# Patient Record
Sex: MALE | Race: WHITE | URBAN - METROPOLITAN AREA
[De-identification: names, ages, dates, MRNs, and addresses within clinical notes are randomized per-mention and may not be internally consistent; named-entity substitution may affect disease eponyms.]

---

## 2024-10-15 ENCOUNTER — OFFICE VISIT (OUTPATIENT)
Dept: FAMILY MEDICINE CLINIC | Facility: CLINIC | Age: 59
End: 2024-10-15
Payer: COMMERCIAL

## 2024-10-15 VITALS
HEIGHT: 68 IN | HEART RATE: 56 BPM | SYSTOLIC BLOOD PRESSURE: 116 MMHG | WEIGHT: 231 LBS | DIASTOLIC BLOOD PRESSURE: 73 MMHG | OXYGEN SATURATION: 99 % | BODY MASS INDEX: 35.01 KG/M2

## 2024-10-15 DIAGNOSIS — R93.89 ABNORMAL ULTRASOUND: ICD-10-CM

## 2024-10-15 DIAGNOSIS — Z00.00 ANNUAL PHYSICAL EXAM: Primary | ICD-10-CM

## 2024-10-15 DIAGNOSIS — Z12.2 SCREENING FOR LUNG CANCER: ICD-10-CM

## 2024-10-15 DIAGNOSIS — R94.39 ABNORMAL STRESS TEST: ICD-10-CM

## 2024-10-15 DIAGNOSIS — Z12.11 SCREENING FOR COLON CANCER: ICD-10-CM

## 2024-10-15 PROCEDURE — 99386 PREV VISIT NEW AGE 40-64: CPT | Performed by: FAMILY MEDICINE

## 2024-10-15 RX ORDER — ATORVASTATIN CALCIUM 20 MG/1
20 TABLET, FILM COATED ORAL NIGHTLY
COMMUNITY

## 2024-10-15 RX ORDER — FOLIC ACID 1 MG/1
TABLET ORAL
COMMUNITY

## 2024-10-15 NOTE — PROGRESS NOTES
Subjective:   Patient ID: Alejandrina Zuleta is a 59 year old male.    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  Negative for activity change, appetite change, diaphoresis and fatigue.     Respiratory: Negative.  Negative for apnea, cough, chest tightness and shortness of breath.    Cardiovascular: Negative.  Negative for chest pain, palpitations and leg swelling.   Gastrointestinal: Negative.  Negative for abdominal pain.   Skin: Negative.           Psychiatric/Behavioral: Negative.        Current Outpatient Medications   Medication Sig Dispense Refill    atorvastatin 20 MG Oral Tab Take 1 tablet (20 mg total) by mouth nightly.      Cholecalciferol (VITAMIN D3) 1.25 MG (19967 UT) Oral Cap Take by mouth.       Allergies:Allergies[1]    Objective:   Physical Exam  Constitutional:       Appearance: He is well-developed.   Cardiovascular:      Rate and Rhythm: Normal rate and regular rhythm.      Heart sounds: Normal heart sounds.   Pulmonary:      Effort: Pulmonary effort is normal.      Breath sounds: Normal breath sounds.   Abdominal:      General: Bowel sounds are normal.      Palpations: Abdomen is soft.   Skin:     General: Skin is warm and dry.   Neurological:      Mental Status: He is alert.      Deep Tendon Reflexes: Reflexes are normal and symmetric.         Assessment & Plan:   1. Annual physical exam    2. Screening for colon cancer    3. Abnormal ultrasound    4. Abnormal stress test    5. Screening for lung cancer    Needs to do testing as above   Diet and exercise discussed will f/u     Orders Placed This Encounter   Procedures    Comp Metabolic Panel (14)    Lipid Panel    CBC With Differential With Platelet    PSA (Screening) [E]    Hemoglobin A1C    TSH [E]       Meds This Visit:  Requested  Prescriptions      No prescriptions requested or ordered in this encounter       Imaging & Referrals:  CARDIO - INTERNAL  OP REFERRAL TO [facility] GI TELEPHONE COLON SCREEN  US CAROTID DOPPLER BILAT - DIAG IMG (CPT=93880)  CT LUNG LD SCREENING(CPT=71271)         [1] No Known Allergies

## 2024-10-16 ENCOUNTER — PATIENT MESSAGE (OUTPATIENT)
Dept: ADMINISTRATIVE | Age: 59
End: 2024-10-16

## 2024-10-18 ENCOUNTER — TELEPHONE (OUTPATIENT)
Dept: FAMILY MEDICINE CLINIC | Facility: CLINIC | Age: 59
End: 2024-10-18

## 2024-10-18 NOTE — TELEPHONE ENCOUNTER
Language Line Annelise, [id number], German    Patient called (identified name and ),   Was returning call about his wife Alanis Zuleta's previous mammogram information.  See separate telephone encounter for  [id number].    Patient also had question about a bill he received.  Warm transferred to Billing at [phone].

## 2024-10-23 LAB
ABSOLUTE BASOPHILS: 69 CELLS/UL (ref 0–200)
ABSOLUTE EOSINOPHILS: 101 CELLS/UL (ref 15–500)
ABSOLUTE LYMPHOCYTES: 1821 CELLS/UL (ref 850–3900)
ABSOLUTE MONOCYTES: 485 CELLS/UL (ref 200–950)
ABSOLUTE NEUTROPHILS: 3824 CELLS/UL (ref 1500–7800)
ALBUMIN/GLOBULIN RATIO: 1.6 (CALC) (ref 1–2.5)
ALBUMIN: 4.5 G/DL (ref 3.6–5.1)
ALKALINE PHOSPHATASE: 44 U/L (ref 35–144)
ALT: 36 U/L (ref 9–46)
AST: 22 U/L (ref 10–35)
BASOPHILS: 1.1 %
BILIRUBIN, TOTAL: 1 MG/DL (ref 0.2–1.2)
BUN: 14 MG/DL (ref 7–25)
CALCIUM: 9.2 MG/DL (ref 8.6–10.3)
CARBON DIOXIDE: 27 MMOL/L (ref 20–32)
CHLORIDE: 104 MMOL/L (ref 98–110)
CHOL/HDLC RATIO: 5.9 (CALC)
CHOLESTEROL, TOTAL: 224 MG/DL
CREATININE: 0.86 MG/DL (ref 0.7–1.35)
EGFR: 99 ML/MIN/1.73M2
EOSINOPHILS: 1.6 %
GLOBULIN: 2.8 G/DL (CALC) (ref 1.9–3.7)
GLUCOSE: 82 MG/DL (ref 65–99)
HDL CHOLESTEROL: 38 MG/DL
HEMATOCRIT: 48.7 % (ref 38.5–50)
HEMOGLOBIN A1C: 5.9 % OF TOTAL HGB
HEMOGLOBIN: 16.6 G/DL (ref 13.2–17.1)
LDL-CHOLESTEROL: 155 MG/DL (CALC)
LYMPHOCYTES: 28.9 %
MCH: 31.3 PG (ref 27–33)
MCHC: 34.1 G/DL (ref 32–36)
MCV: 91.9 FL (ref 80–100)
MONOCYTES: 7.7 %
MPV: 10.7 FL (ref 7.5–12.5)
NEUTROPHILS: 60.7 %
NON-HDL CHOLESTEROL: 186 MG/DL (CALC)
PLATELET COUNT: 145 THOUSAND/UL (ref 140–400)
POTASSIUM: 4.3 MMOL/L (ref 3.5–5.3)
PROTEIN, TOTAL: 7.3 G/DL (ref 6.1–8.1)
PSA, TOTAL: 0.4 NG/ML
RDW: 12.6 % (ref 11–15)
RED BLOOD CELL COUNT: 5.3 MILLION/UL (ref 4.2–5.8)
SODIUM: 140 MMOL/L (ref 135–146)
TRIGLYCERIDES: 176 MG/DL
TSH W/REFLEX TO FT4: 2.03 MIU/L (ref 0.4–4.5)
WHITE BLOOD CELL COUNT: 6.3 THOUSAND/UL (ref 3.8–10.8)

## 2024-10-24 ENCOUNTER — TELEPHONE (OUTPATIENT)
Dept: FAMILY MEDICINE CLINIC | Facility: CLINIC | Age: 59
End: 2024-10-24

## 2024-10-24 DIAGNOSIS — E78.00 PURE HYPERCHOLESTEROLEMIA: Primary | ICD-10-CM

## 2024-10-24 NOTE — TELEPHONE ENCOUNTER
Patient calling, saw results on mychart.   Patient was asking MD to call him about results because she speaks his language.  Advised that we need to allow 3-4 business days for MD to review results and respond accordingly   Patient states understanding.  He does not use mychart and wants a phone call please.     FYI to PCP so results can be called by her or by RN staff once reviewed  Patient states understanding we need time to review and does not expect  call back today or tomorrow, just wants to be called with results vs mychart messages.

## 2024-10-25 RX ORDER — ROSUVASTATIN CALCIUM 20 MG/1
20 TABLET, COATED ORAL NIGHTLY
Qty: 90 TABLET | Refills: 1 | Status: SHIPPED | OUTPATIENT
Start: 2024-10-25 | End: 2025-04-23

## 2024-11-19 RX ORDER — FOLIC ACID 1 MG/1
1 TABLET ORAL WEEKLY
Qty: 12 CAPSULE | Refills: 3 | Status: SHIPPED | OUTPATIENT
Start: 2024-11-19

## 2024-11-19 NOTE — TELEPHONE ENCOUNTER
Please review. Protocol Failed; No Protocol    Medication(s) to Refill:   Requested Prescriptions     Pending Prescriptions Disp Refills    Cholecalciferol (VITAMIN D3) 1.25 MG (36048 UT) Oral Cap 1 capsule 0     Sig: Take by mouth.         Reason for Medication Refill being sent to Provider / Reason Protocol Failed:  [x] Non-Protocol Medication        Recent Labs:  No results found for: \"VITD\", \"QVITD\", \"[id number]\"           Requested Prescriptions   Pending Prescriptions Disp Refills    Cholecalciferol (VITAMIN D3) 1.25 MG (73416 UT) Oral Cap 1 capsule 0     Sig: Take by mouth.       There is no refill protocol information for this order              Recent Outpatient Visits              1 month ago Annual physical exam    [facility] [place]    Office Visit

## 2024-12-03 ENCOUNTER — TELEPHONE (OUTPATIENT)
Dept: FAMILY MEDICINE CLINIC | Facility: CLINIC | Age: 59
End: 2024-12-03

## 2024-12-06 ENCOUNTER — TELEPHONE (OUTPATIENT)
Dept: FAMILY MEDICINE CLINIC | Facility: CLINIC | Age: 59
End: 2024-12-06

## 2024-12-06 DIAGNOSIS — R91.1 LUNG NODULE: Primary | ICD-10-CM

## 2024-12-06 NOTE — TELEPHONE ENCOUNTER
Spoke to the patient   Lung nodules increased -needs repeat in 3 months   Order placed patient agrees

## 2024-12-10 DIAGNOSIS — I25.10 CAD (CORONARY ARTERY DISEASE): Primary | ICD-10-CM

## 2024-12-10 DIAGNOSIS — R06.02 SOB (SHORTNESS OF BREATH): ICD-10-CM

## 2024-12-12 ENCOUNTER — TELEPHONE (OUTPATIENT)
Dept: FAMILY MEDICINE CLINIC | Facility: CLINIC | Age: 59
End: 2024-12-12

## 2024-12-12 DIAGNOSIS — I25.10 CORONARY ARTERY DISEASE INVOLVING NATIVE CORONARY ARTERY OF NATIVE HEART WITHOUT ANGINA PECTORIS: Primary | ICD-10-CM

## 2024-12-12 NOTE — TELEPHONE ENCOUNTER
Patient is having a ct angio coronary procedure on 12/19 but needs to get labs before. Needs a bmp. Spoke to justyna and states she does not see any order or results in epic. Had advised patient that these lab results need to be in by Wednesday.

## 2024-12-16 LAB
BUN: 12 MG/DL (ref 7–25)
CALCIUM: 9.4 MG/DL (ref 8.6–10.3)
CARBON DIOXIDE: 27 MMOL/L (ref 20–32)
CHLORIDE: 102 MMOL/L (ref 98–110)
CREATININE: 0.93 MG/DL (ref 0.7–1.35)
EGFR: 94 ML/MIN/1.73M2
GLUCOSE: 93 MG/DL (ref 65–99)
POTASSIUM: 4.1 MMOL/L (ref 3.5–5.3)
SODIUM: 139 MMOL/L (ref 135–146)

## 2024-12-16 NOTE — TELEPHONE ENCOUNTER
Michelle calling from [facility]     Michelle states the patient is supposed to have labs done and procedure  may be canceled if labs not done  ( 12/19/24 )       Michelle states fax was sent on 12/12 for Dr Ovalles for  pre-medication for this patient      ( Coronary CT angio communication form )     [facility]  staff-- Please check for incoming fax         Called patient  at  [phone] with Language Line  Camila  ID# [id number]      Left Voicemail to call back our office. Office phone number provided with office telephone hours.     Staff-patient needs to have BMP done  ASAP and should go to [facility]

## 2024-12-16 NOTE — TELEPHONE ENCOUNTER
Patient returned our call ( Identified name and date of birth )     Patient asking for Rwandan      With Language Line  Maureenodon   ID # [id number]    Explained to the patient he needs to have BMP done ASAP so procedure does not get canceled  as our office spoke with the Cardiac center   Patient states he will go to [facility] as it is close to his home   Explained to the patient that Michelle Murray states if labs are not resulted they procedure may be canceled     Order placed to [facility] as requested   Patient states has fax number for Frances to fax her the results once he receives them

## 2025-01-20 RX ORDER — FOLIC ACID 1 MG/1
1 TABLET ORAL WEEKLY
Qty: 12 CAPSULE | Refills: 3 | Status: SHIPPED | OUTPATIENT
Start: 2025-01-20

## 2025-01-20 RX ORDER — ROSUVASTATIN CALCIUM 20 MG/1
20 TABLET, COATED ORAL NIGHTLY
Qty: 90 TABLET | Refills: 1 | Status: SHIPPED | OUTPATIENT
Start: 2025-01-20 | End: 2025-07-19

## 2025-01-31 ENCOUNTER — TELEPHONE (OUTPATIENT)
Dept: FAMILY MEDICINE CLINIC | Facility: CLINIC | Age: 60
End: 2025-01-31

## 2025-01-31 DIAGNOSIS — I25.10 CORONARY ARTERY DISEASE: Primary | ICD-10-CM

## 2025-01-31 NOTE — TELEPHONE ENCOUNTER
Dr. Ovalles: can you call patient.   He would like to explain what he needs. He said Dr Mac ordered some tests on him.   Please call him to clarify. He preferred to speak to you.

## 2025-01-31 NOTE — TELEPHONE ENCOUNTER
Patient  is calling with the assistance of  ID # [id number]  requesting the Basic Metabolic Panel 8 be sent to [facility]    Patient mentions he needs to complete this to be able to complete the MRI.

## 2025-03-11 ENCOUNTER — HOSPITAL ENCOUNTER (OUTPATIENT)
Dept: CT IMAGING | Age: 60
End: 2025-03-11
Attending: INTERNAL MEDICINE

## 2025-04-14 ENCOUNTER — TELEPHONE (OUTPATIENT)
Dept: FAMILY MEDICINE CLINIC | Facility: CLINIC | Age: 60
End: 2025-04-14

## 2025-04-14 DIAGNOSIS — E78.00 PURE HYPERCHOLESTEROLEMIA: Primary | ICD-10-CM

## 2025-04-14 NOTE — TELEPHONE ENCOUNTER
Patient is requesting follow up labs be placed and mentions he does not need to schedule this appointment with the provider at this time.    Patient mentions he will schedule with provider at a later time..       Patient is requesting [facility] preferred    Please call patient to confirm labs ordered

## 2025-04-15 NOTE — TELEPHONE ENCOUNTER
With Uzbek  Chikis #[id number],  Left message to call back-transfer to triage.        Micaela Ovalles MD to Em Rn Triage        4/15/25  9:58 AM  The order placed please call him

## 2025-04-20 LAB
ALBUMIN/GLOBULIN RATIO: 1.9 (CALC) (ref 1–2.5)
ALBUMIN: 5 G/DL (ref 3.6–5.1)
ALKALINE PHOSPHATASE: 43 U/L (ref 35–144)
ALT: 35 U/L (ref 9–46)
AST: 25 U/L (ref 10–35)
BILIRUBIN, DIRECT: 0.2 MG/DL
BILIRUBIN, INDIRECT: 0.8 MG/DL (CALC) (ref 0.2–1.2)
BILIRUBIN, TOTAL: 1 MG/DL (ref 0.2–1.2)
CHOL/HDLC RATIO: 3.6 (CALC)
CHOLESTEROL, TOTAL: 138 MG/DL
GLOBULIN: 2.6 G/DL (CALC) (ref 1.9–3.7)
HDL CHOLESTEROL: 38 MG/DL
LDL-CHOLESTEROL: 76 MG/DL (CALC)
NON-HDL CHOLESTEROL: 100 MG/DL (CALC)
PROTEIN, TOTAL: 7.6 G/DL (ref 6.1–8.1)
TRIGLYCERIDES: 144 MG/DL

## 2025-04-24 ENCOUNTER — TELEMEDICINE (OUTPATIENT)
Dept: FAMILY MEDICINE CLINIC | Facility: CLINIC | Age: 60
End: 2025-04-24
Payer: COMMERCIAL

## 2025-04-24 DIAGNOSIS — R91.8 PULMONARY NODULES: Primary | ICD-10-CM

## 2025-04-24 PROCEDURE — 98005 SYNCH AUDIO-VIDEO EST LOW 20: CPT | Performed by: FAMILY MEDICINE

## 2025-04-24 NOTE — PROGRESS NOTES
Subjective:   Patient ID: Alejandrina Zuleta is a 59 year old male.  Telehealth outside of [facility]  Telehealth Verbal Consent   I conducted a telehealth visit with Alejandrina Zuleta today, 04/24/25, which was completed using two-way, real-time interactive audio and video communication. This has been done in good severiano to provide continuity of care in the best interest of the provider-patient relationship, due to the COVID -19 public health crisis/national emergency where restrictions of face-to-face office visits are ongoing. Every conscious effort was taken to allow for sufficient and adequate time to complete the visit.  The patient was made aware of the limitations of the telehealth visit, including treatment limitations as no physical exam could be performed.  The patient was advised to call 911 or to go to the ER in case there was an emergency.  The patient was also advised of the potential privacy & security concerns related to the telehealth platform.   The patient was made aware of where to find [facility]'s notice of privacy practices, telehealth consent form and other related consent forms and documents.  which are located on the [facility] website. The patient verbally agreed to telehealth consent form, related consents and the risks discussed.    Lastly, the patient confirmed that they were in [place].   Included in this visit, time may have been spent reviewing labs, medications, radiology tests and decision making. Appropriate medical decision-making and tests are ordered as detailed in the plan of care above.  Coding/billing information is submitted for this visit based on complexity of care and/or time spent for the visit.  This is video visit      HPI  Patient here for f/u test results   Also reporting some dyspnea and also chest pain on and off   Its more like pressure type of pain  History/Other:   Review of Systems    Constitutional: Negative.  Negative for activity change, appetite change, diaphoresis and fatigue.      Respiratory: Negative.  Negative for apnea, cough, chest tightness and shortness of breath.    Cardiovascular: see hpi Gastrointestinal: Negative.  Negative for abdominal pain.   Skin: Negative.           Psychiatric/Behavioral: Negative.        Current Medications[1]  Allergies:Allergies[2]    Objective:   Physical Exam  Constitutional:       Appearance: He is well-developed.   Cardiovascular:      Rate and Rhythm: Normal rate and regular rhythm.      Heart sounds: Normal heart sounds.   Pulmonary:      Effort: Pulmonary effort is normal.      Breath sounds: Normal breath sounds.   Abdominal:      General: Bowel sounds are normal.      Palpations: Abdomen is soft.   Neurological:      Mental Status: He is alert.      Deep Tendon Reflexes: Reflexes are normal and symmetric.         Assessment & Plan:   1. Pulmonary nodules    Needs repeat test ct scan   2. Chest pain   Needs to see cardiologist     No orders of the defined types were placed in this encounter.      Meds This Visit:  Requested Prescriptions      No prescriptions requested or ordered in this encounter       Imaging & Referrals:  CT CHEST (CPT=71250)         [1]   Current Outpatient Medications   Medication Sig Dispense Refill    rosuvastatin (CRESTOR) 20 MG Oral Tab Take 1 tablet (20 mg total) by mouth nightly. For cholesterol. 90 tablet 1    Cholecalciferol (VITAMIN D3) 1.25 MG (86742 UT) Oral Cap Take 1 capsule by mouth once a week. 12 capsule 3   [2] No Known Allergies

## 2025-05-05 ENCOUNTER — TELEPHONE (OUTPATIENT)
Dept: FAMILY MEDICINE CLINIC | Facility: CLINIC | Age: 60
End: 2025-05-05

## 2025-05-06 ENCOUNTER — TELEPHONE (OUTPATIENT)
Dept: FAMILY MEDICINE CLINIC | Facility: CLINIC | Age: 60
End: 2025-05-06

## 2025-05-06 NOTE — TELEPHONE ENCOUNTER
Patient calling to request order for CT Chest be faxed to [facility] at [phone]. Needs to have order sent to reschedule. Asking if can be sent to day, asking for CubeSensorst message or phone call once sent.

## 2025-05-08 ENCOUNTER — TELEPHONE (OUTPATIENT)
Dept: FAMILY MEDICINE CLINIC | Facility: CLINIC | Age: 60
End: 2025-05-08

## 2025-05-09 ENCOUNTER — TELEPHONE (OUTPATIENT)
Dept: FAMILY MEDICINE CLINIC | Facility: CLINIC | Age: 60
End: 2025-05-09

## 2025-08-05 RX ORDER — ROSUVASTATIN CALCIUM 20 MG/1
20 TABLET, COATED ORAL NIGHTLY
Qty: 90 TABLET | Refills: 3 | Status: CANCELLED | OUTPATIENT
Start: 2025-08-05 | End: 2026-07-31

## 2025-08-05 RX ORDER — ROSUVASTATIN CALCIUM 20 MG/1
20 TABLET, COATED ORAL NIGHTLY
Qty: 90 TABLET | Refills: 3 | Status: SHIPPED | OUTPATIENT
Start: 2025-08-05 | End: 2026-07-31